# Patient Record
Sex: MALE | Race: WHITE
[De-identification: names, ages, dates, MRNs, and addresses within clinical notes are randomized per-mention and may not be internally consistent; named-entity substitution may affect disease eponyms.]

---

## 2020-12-01 ENCOUNTER — HOSPITAL ENCOUNTER (EMERGENCY)
Dept: HOSPITAL 62 - ER | Age: 20
Discharge: HOME | End: 2020-12-01
Payer: COMMERCIAL

## 2020-12-01 VITALS — DIASTOLIC BLOOD PRESSURE: 74 MMHG | SYSTOLIC BLOOD PRESSURE: 127 MMHG

## 2020-12-01 DIAGNOSIS — A56.8: Primary | ICD-10-CM

## 2020-12-01 DIAGNOSIS — A54.9: ICD-10-CM

## 2020-12-01 LAB
APPEARANCE UR: (no result)
APTT PPP: YELLOW S
BILIRUB UR QL STRIP: NEGATIVE
CHLAM PCR: DETECTED
GLUCOSE UR STRIP-MCNC: NEGATIVE MG/DL
KETONES UR STRIP-MCNC: NEGATIVE MG/DL
NITRITE UR QL STRIP: NEGATIVE
PH UR STRIP: 7 [PH] (ref 5–9)
PROT UR STRIP-MCNC: NEGATIVE MG/DL
SP GR UR STRIP: 1.02
UROBILINOGEN UR-MCNC: NEGATIVE MG/DL (ref ?–2)

## 2020-12-01 PROCEDURE — 87591 N.GONORRHOEAE DNA AMP PROB: CPT

## 2020-12-01 PROCEDURE — 99284 EMERGENCY DEPT VISIT MOD MDM: CPT

## 2020-12-01 PROCEDURE — 87491 CHLMYD TRACH DNA AMP PROBE: CPT

## 2020-12-01 PROCEDURE — 81001 URINALYSIS AUTO W/SCOPE: CPT

## 2020-12-01 PROCEDURE — 96372 THER/PROPH/DIAG INJ SC/IM: CPT

## 2020-12-01 NOTE — ER DOCUMENT REPORT
HPI





- HPI


Time Seen by Provider: 12/01/20 18:41


Notes: 





20-year-old male presents to the emergency room for evaluation after STD 

exposure, reports his girlfriend tested positive for chlamydia.  States he had a

1 night stand 2 months ago, unprotected sexual intercourse and then he met his 

girlfriend, he went away to [place] for 2 months and then came back and found

out that she tested positive for chlamydia.  He is unsure if he gave her 

chlamydia or if she had chlamydia prior to meeting him.  She has been treated.  

Denies any testicular pain, dysuria, penile drainage.  Denies any fevers or 

chills.  Has not tried any over-the-counter medications.  Denies prior history 

of STIs.  Denies any chest pain, shortness of breath, nausea vomiting or 

diarrhea.  Denies any bowel or bladder dysfunction, no saddle anesthesia.





Past Medical History





- General


Information source: Patient





- Social History


Smoking Status: Unknown if Ever Smoked


Family History: None


Psychiatric Medical History: Reports: Hx Attention Deficit Hyperactivity 

Disorder





- Immunizations


Immunizations up to date: Yes





Vertical Provider Document





- CONSTITUTIONAL


Agree With Documented VS: Yes


Exam Limitations: No Limitations


General Appearance: WD/WN


Notes: 








MEDICATIONS: I agree with the patient medications as charted by the RN.





ALLERGIES: I agree with the allergies as charted by the RN.





PAST MEDICAL HISTORY/PAST SURGICAL HISTORY: Reviewed and agree as charted by RN.





SOCIAL HISTORY: Reviewed and agree as charted by RN.





FAMILY HISTORY: No significant familial comorbid conditions directly related to 

patient complaint





EXAM:


Reviewed vital signs as charted by RN.





PHYSICAL EXAMINATION:reviewed vital signs by RN





GENERAL: Well-appearing, well-nourished and in no acute distress.





HEAD: Atraumatic, normocephalic.





EYES: Pupils equal round and reactive to light, extraocular movements intact, 

sclera anicteric, conjunctiva are normal.





ENT: Nares patent, oropharynx clear without exudates.  Moist mucous membranes.





NECK: Normal range of motion, supple without lymphadenopathy





LUNGS: Breath sounds clear to auscultation bilaterally and equal.  No wheezes 

rales or rhonchi.





HEART: Regular rate and rhythm without murmurs





ABDOMEN: Soft, nontender, nondistended abdomen.  No guarding, no rebound.  No 

masses appreciated.





Musculoskeletal: Normal range of motion, no pitting or edema.  No cyanosis.





NEUROLOGICAL: Cranial nerves grossly intact.  Normal speech, normal gait.  

Normal sensory, motor exams 





PSYCH: Normal mood, normal affect.





SKIN: Warm, Dry, normal turgor, no rashes or lesions noted.





Course





- Re-evaluation


Re-evalutation: 





12/01/20 18:48


 Patient treated with azithromycin 1 g and Rocephin 250 mg IM for treatment of 

chlamydia and gonorrhea. Do not engage in sexual intercourse for 7-10 days after

treatment. Advised that partner needs to be treated as well so you are not 

reinfected.  Pt verbalizes that understands the risks that come with having 

unprotected sex. discussed safe sex, using protection. pt was tx'd for G/C at 

this visit. advised to have protected sex always, go [facility] 

for further blood testing for HIV, hepatitis C, etc.  Pt verbalized 

understanding of these instructions and agreed with plan of care. 





Discharge





- Discharge


Clinical Impression: 


 STD (sexually transmitted disease)





Condition: Stable


Disposition: HOME, SELF-CARE


Additional Instructions: 


You have been treated with azithromycin 1 g and Rocephin 250 mg IM for treatment

of chlamydia and gonorrhea. Do not engage in sexual intercourse for 7-10 days 

after treatement. discussed safe sex, using protection. pt was tx'd for G/C at 

this visit.  Advised to have protected sex always, go to [facility] 

for further blood testing for HIV, hepatitis C, etc.  





Return immediately for any new or worsening symptoms.





Follow up with primary care provider, call tomorrow to make followup 

appointment.


Referrals: 


ADILIA CLARKE MD [Primary Care Provider] - Follow up as needed